# Patient Record
Sex: FEMALE | Race: OTHER | HISPANIC OR LATINO
[De-identification: names, ages, dates, MRNs, and addresses within clinical notes are randomized per-mention and may not be internally consistent; named-entity substitution may affect disease eponyms.]

---

## 2019-07-17 ENCOUNTER — APPOINTMENT (OUTPATIENT)
Dept: PLASTIC SURGERY | Facility: CLINIC | Age: 49
End: 2019-07-17

## 2019-07-17 PROBLEM — Z00.00 ENCOUNTER FOR PREVENTIVE HEALTH EXAMINATION: Status: ACTIVE | Noted: 2019-07-17

## 2021-10-18 ENCOUNTER — INPATIENT (INPATIENT)
Facility: HOSPITAL | Age: 51
LOS: 0 days | Discharge: HOME | DRG: 310 | End: 2021-10-19
Attending: INTERNAL MEDICINE | Admitting: INTERNAL MEDICINE
Payer: COMMERCIAL

## 2021-10-18 VITALS
WEIGHT: 139.99 LBS | SYSTOLIC BLOOD PRESSURE: 128 MMHG | OXYGEN SATURATION: 97 % | RESPIRATION RATE: 16 BRPM | DIASTOLIC BLOOD PRESSURE: 85 MMHG | HEIGHT: 62 IN | HEART RATE: 75 BPM | TEMPERATURE: 98 F

## 2021-10-18 DIAGNOSIS — Z95.818 PRESENCE OF OTHER CARDIAC IMPLANTS AND GRAFTS: Chronic | ICD-10-CM

## 2021-10-18 DIAGNOSIS — R00.1 BRADYCARDIA, UNSPECIFIED: ICD-10-CM

## 2021-10-18 LAB
A1C WITH ESTIMATED AVERAGE GLUCOSE RESULT: 5.6 % — SIGNIFICANT CHANGE UP (ref 4–5.6)
ALBUMIN SERPL ELPH-MCNC: 4.3 G/DL — SIGNIFICANT CHANGE UP (ref 3.3–5)
ALP SERPL-CCNC: 84 U/L — SIGNIFICANT CHANGE UP (ref 40–120)
ALT FLD-CCNC: 15 U/L — SIGNIFICANT CHANGE UP (ref 10–45)
ANION GAP SERPL CALC-SCNC: 12 MMOL/L — SIGNIFICANT CHANGE UP (ref 5–17)
APTT BLD: 28.2 SEC — SIGNIFICANT CHANGE UP (ref 27.5–35.5)
AST SERPL-CCNC: 23 U/L — SIGNIFICANT CHANGE UP (ref 10–40)
BASOPHILS # BLD AUTO: 0.07 K/UL — SIGNIFICANT CHANGE UP (ref 0–0.2)
BASOPHILS NFR BLD AUTO: 1.1 % — SIGNIFICANT CHANGE UP (ref 0–2)
BILIRUB SERPL-MCNC: 0.6 MG/DL — SIGNIFICANT CHANGE UP (ref 0.2–1.2)
BUN SERPL-MCNC: 8 MG/DL — SIGNIFICANT CHANGE UP (ref 7–23)
CALCIUM SERPL-MCNC: 10 MG/DL — SIGNIFICANT CHANGE UP (ref 8.4–10.5)
CHLORIDE SERPL-SCNC: 102 MMOL/L — SIGNIFICANT CHANGE UP (ref 96–108)
CHOLEST SERPL-MCNC: 214 MG/DL — HIGH
CK MB CFR SERPL CALC: 1.4 NG/ML — SIGNIFICANT CHANGE UP (ref 0–6.7)
CK MB CFR SERPL CALC: 1.9 NG/ML — SIGNIFICANT CHANGE UP (ref 0–6.7)
CK SERPL-CCNC: 52 U/L — SIGNIFICANT CHANGE UP (ref 25–170)
CK SERPL-CCNC: 67 U/L — SIGNIFICANT CHANGE UP (ref 25–170)
CO2 SERPL-SCNC: 25 MMOL/L — SIGNIFICANT CHANGE UP (ref 22–31)
CREAT SERPL-MCNC: 0.46 MG/DL — LOW (ref 0.5–1.3)
EOSINOPHIL # BLD AUTO: 0.41 K/UL — SIGNIFICANT CHANGE UP (ref 0–0.5)
EOSINOPHIL NFR BLD AUTO: 6.6 % — HIGH (ref 0–6)
ESTIMATED AVERAGE GLUCOSE: 114 MG/DL — SIGNIFICANT CHANGE UP (ref 68–114)
GLUCOSE SERPL-MCNC: 91 MG/DL — SIGNIFICANT CHANGE UP (ref 70–99)
HCT VFR BLD CALC: 42.3 % — SIGNIFICANT CHANGE UP (ref 34.5–45)
HDLC SERPL-MCNC: 74 MG/DL — SIGNIFICANT CHANGE UP
HGB BLD-MCNC: 14 G/DL — SIGNIFICANT CHANGE UP (ref 11.5–15.5)
IMM GRANULOCYTES NFR BLD AUTO: 0.2 % — SIGNIFICANT CHANGE UP (ref 0–1.5)
INR BLD: 0.93 — SIGNIFICANT CHANGE UP (ref 0.88–1.16)
LIPID PNL WITH DIRECT LDL SERPL: 109 MG/DL — HIGH
LYMPHOCYTES # BLD AUTO: 2.02 K/UL — SIGNIFICANT CHANGE UP (ref 1–3.3)
LYMPHOCYTES # BLD AUTO: 32.4 % — SIGNIFICANT CHANGE UP (ref 13–44)
MAGNESIUM SERPL-MCNC: 2.3 MG/DL — SIGNIFICANT CHANGE UP (ref 1.6–2.6)
MCHC RBC-ENTMCNC: 30.9 PG — SIGNIFICANT CHANGE UP (ref 27–34)
MCHC RBC-ENTMCNC: 33.1 GM/DL — SIGNIFICANT CHANGE UP (ref 32–36)
MCV RBC AUTO: 93.4 FL — SIGNIFICANT CHANGE UP (ref 80–100)
MONOCYTES # BLD AUTO: 0.63 K/UL — SIGNIFICANT CHANGE UP (ref 0–0.9)
MONOCYTES NFR BLD AUTO: 10.1 % — SIGNIFICANT CHANGE UP (ref 2–14)
NEUTROPHILS # BLD AUTO: 3.1 K/UL — SIGNIFICANT CHANGE UP (ref 1.8–7.4)
NEUTROPHILS NFR BLD AUTO: 49.6 % — SIGNIFICANT CHANGE UP (ref 43–77)
NON HDL CHOLESTEROL: 140 MG/DL — HIGH
NRBC # BLD: 0 /100 WBCS — SIGNIFICANT CHANGE UP (ref 0–0)
PHOSPHATE SERPL-MCNC: 4.6 MG/DL — HIGH (ref 2.5–4.5)
PLATELET # BLD AUTO: 244 K/UL — SIGNIFICANT CHANGE UP (ref 150–400)
POTASSIUM SERPL-MCNC: 4.2 MMOL/L — SIGNIFICANT CHANGE UP (ref 3.5–5.3)
POTASSIUM SERPL-SCNC: 4.2 MMOL/L — SIGNIFICANT CHANGE UP (ref 3.5–5.3)
PROT SERPL-MCNC: 7.9 G/DL — SIGNIFICANT CHANGE UP (ref 6–8.3)
PROTHROM AB SERPL-ACNC: 11.2 SEC — SIGNIFICANT CHANGE UP (ref 10.6–13.6)
RBC # BLD: 4.53 M/UL — SIGNIFICANT CHANGE UP (ref 3.8–5.2)
RBC # FLD: 13.9 % — SIGNIFICANT CHANGE UP (ref 10.3–14.5)
SARS-COV-2 RNA SPEC QL NAA+PROBE: NEGATIVE — SIGNIFICANT CHANGE UP
SODIUM SERPL-SCNC: 139 MMOL/L — SIGNIFICANT CHANGE UP (ref 135–145)
TRIGL SERPL-MCNC: 156 MG/DL — HIGH
TROPONIN T SERPL-MCNC: 0.01 NG/ML — SIGNIFICANT CHANGE UP (ref 0–0.01)
TROPONIN T SERPL-MCNC: <0.01 NG/ML — SIGNIFICANT CHANGE UP (ref 0–0.01)
TSH SERPL-MCNC: 2.3 UIU/ML — SIGNIFICANT CHANGE UP (ref 0.27–4.2)
WBC # BLD: 6.24 K/UL — SIGNIFICANT CHANGE UP (ref 3.8–10.5)
WBC # FLD AUTO: 6.24 K/UL — SIGNIFICANT CHANGE UP (ref 3.8–10.5)

## 2021-10-18 PROCEDURE — 93010 ELECTROCARDIOGRAM REPORT: CPT

## 2021-10-18 PROCEDURE — 99285 EMERGENCY DEPT VISIT HI MDM: CPT

## 2021-10-18 PROCEDURE — 71045 X-RAY EXAM CHEST 1 VIEW: CPT | Mod: 26

## 2021-10-18 RX ORDER — OMEGA-3 ACID ETHYL ESTERS 1 G
2 CAPSULE ORAL DAILY
Refills: 0 | Status: DISCONTINUED | OUTPATIENT
Start: 2021-10-18 | End: 2021-10-18

## 2021-10-18 RX ORDER — CHOLECALCIFEROL (VITAMIN D3) 125 MCG
400 CAPSULE ORAL DAILY
Refills: 0 | Status: DISCONTINUED | OUTPATIENT
Start: 2021-10-19 | End: 2021-10-19

## 2021-10-18 RX ORDER — ASPIRIN/CALCIUM CARB/MAGNESIUM 324 MG
81 TABLET ORAL DAILY
Refills: 0 | Status: DISCONTINUED | OUTPATIENT
Start: 2021-10-18 | End: 2021-10-19

## 2021-10-18 RX ADMIN — Medication 81 MILLIGRAM(S): at 15:56

## 2021-10-18 NOTE — H&P ADULT - GASTROINTESTINAL
Pt Name: Bridgett Rivera  YOB: 1965     To Whom It May Concern:     Bridgett Rivera was in contact with/seen in my office on 03/21/2020. COVID-19 is present in our communities across the state. There is limited testing for COVID at this time, so not all patients can be tested. In this situation, your employee meets the following criteria:     Bridgett Rivera has expressed a desire/need to be tested for COVID-19 virus and does have some symptoms (upper respiratory, fever, etc) but does not meet all the criteria for testing as defined by the Center of Disease Control/Office of Public Health at this time. The employee can return to work once they are asymptomatic for 72 hours without the use of fever reducing medications (Tylenol, Motrin, etc). Infection control policies of the employer should be followed, as well as good hand hygiene.     If you have any questions or concerns, or if I can be of further assistance, please do not hesitate to contact me.     Sincerely,    Provider Signature/Printed Name:Tuyet [place] NP Date:03/21/2020     
Soft, non-tender, no hepatosplenomegaly, normal bowel sounds

## 2021-10-18 NOTE — ED PROVIDER NOTE - PROGRESS NOTE DETAILS
Isaifish: d/w Dr. Peña - pt was having 2:1 AV block w/ HR 30s so called to come to ED. Requesting labs and admission for further care. States he spoke w/ Dr. Lopez from EP who will consult on pt.  updated pt.

## 2021-10-18 NOTE — ED ADULT NURSE NOTE - CHIEF COMPLAINT QUOTE
Pt presents to ED C/O chest tightness starting this morning. Pt on halter monitor since Fri. Sent by outpatient cardiologist Pt states " My doctor told me to come straight to the ER, he was seeing a lot of activity on my monitor". Denies N/V/SOB. Hx of tachycardia.

## 2021-10-18 NOTE — H&P ADULT - NSHPSOCIALHISTORY_GEN_ALL_CORE
former smoker; quit 10 years, smoked 1/2 PPD since 16 years old  denies any illicit drug use  drinks ETOH socially

## 2021-10-18 NOTE — H&P ADULT - ASSESSMENT
52 y/o F with PMH of ?tachycardia with skipped beats as per patient (had loop recorder X 2years, removed 05/2021) who is now admitted to cardiology service to r/o ACS and monitor on tele for bradycardia with EP Dr. Lopez consult.    50 y/o F  Former smoker with FHx of CVA,  PMH of ?tachycardia with skipped beats as per patient (had loop recorder X 2years, removed 05/2021) who is now admitted to cardiology service to r/o ACS and monitor on tele for bradycardia with EP Dr. Lopez consult.    52 y/o F  Former smoker with FHx of CVA,  PMH of ?tachycardia with skipped beats as per patient (had loop recorder X 2years, removed 05/2021), HLD who is now admitted to cardiology service to r/o ACS and monitor on tele for bradycardia with EP Dr. Lopez consult.

## 2021-10-18 NOTE — ED ADULT TRIAGE NOTE - CHIEF COMPLAINT QUOTE
Pt presents to ED C/O chest tightness starting this morning. Pt on halter monitor since Fri. Sent by outpatient cardiologist Pt states " My doctor told me to come straight to the ER, he was seeing a lot of activity on my monitor". Hx of tachycardia. Pt presents to ED C/O chest tightness starting this morning. Pt on halter monitor since Fri. Sent by outpatient cardiologist Pt states " My doctor told me to come straight to the ER, he was seeing a lot of activity on my monitor". Denies N/V/SOB. Hx of tachycardia.

## 2021-10-18 NOTE — H&P ADULT - PROBLEM SELECTOR PLAN 1
Resolved with volume removal, O2 sats stable on supp oxygen 2-3 lpm (baseline)   -Currently asx  -Trop T neg X 1; f/u trop T 8 pm and am (trend earlier if 8 pm trop T elevated)  -EKG: SR with 1st degree AVB @ 67 bpm; flat T wave in lead 3, V3-v4,  -holter monitor revealing 2:1 AV block w/ HR 30s  -monitor on tele  -EP Dr. chris consulted; f/u recs.

## 2021-10-18 NOTE — H&P ADULT - PROBLEM SELECTOR PLAN 2
-Trop T neg X 1; f/u trop T 8 pm and am (trend earlier if 8 pm trop T elevated)  -EKG: SR with 1st degree AVB @ 67 bpm; flat T wave in lead 3, V3-v4,  -ECHO   -NST  -f/u lipid profile, TSH and HgBA1c      DVT PPX: SCD  Dispo: monitor on tele -Trop T neg X 1; f/u trop T 8 pm and am (trend earlier if 8 pm trop T elevated)  -EKG: SR with 1st degree AVB @ 67 bpm; flat T wave in lead 3, V3-v4,  -ECHO 10/15/21 (results pending; Dr. murphy to email)  -NPO after MN for NST in am  -f/u lipid profile, TSH and HgBA1c      DVT PPX: SCD  Dispo: monitor on tele, NST in am, EP recs -Trop T neg X 1; f/u trop T 8 pm and am (trend earlier if 8 pm trop T elevated)  -EKG: SR with 1st degree AVB @ 67 bpm; flat T wave in lead 3, V3-v4,  -ECHO 10/15/21 revealing inferoseptal ischemia; please confirm EF with Dr. Peña  -NPO after MN for NST in am  -f/u lipid profile, TSH and HgBA1c      DVT PPX: SCD  Dispo: monitor on tele, NST in am, EP recs

## 2021-10-18 NOTE — ED ADULT NURSE NOTE - OBJECTIVE STATEMENT
Pt presents to ED sent by cardiologist because "abnormal electrical activity" to holter. Pt have a cardiac holter for 4 days, c/o midsternal chest discomfort, no pain. H/o tachycardia. Denies SOB, HA, N/V/D. Pt presents to ED sent by cardiologist because of "abnormal electrical activity" in the cardiac holter. Pt have a cardiac holter for 4 days, c/o midsternal chest discomfort, no pain. H/o tachycardia. Denies SOB, HA, N/V/D. EKG done, placed on CM showing SR @ 65 bpm.

## 2021-10-18 NOTE — H&P ADULT - NSHPLABSRESULTS_GEN_ALL_CORE
14.0   6.24  )-----------( 244      ( 18 Oct 2021 14:44 )             42.3   PT/INR - ( 18 Oct 2021 14:44 )   PT: 11.2 sec;   INR: 0.93          PTT - ( 18 Oct 2021 14:44 )  PTT:28.2 sec    10-18    139  |  102  |  x   ----------------------------<  x   4.2   |  x   |  x

## 2021-10-18 NOTE — ED PROVIDER NOTE - MDM ORDERS SUBMITTED SELECTION
Urinalysis, thyroid, blood count, cholesterol, prostate are normal.  Liver, kidneys, sugar normal. Labs/EKG

## 2021-10-18 NOTE — ED PROVIDER NOTE - OBJECTIVE STATEMENT
51F PMH "tachycardia" (not on meds) p/w ?abnormal Holter/echo. Pt states she has intermittent palpitations for "a long time." Went to Dr. Dionne Peña 3d ago and had echo performed and holter placed. Pt states she was told to come to ED today 2/2 abnormal results. Last episode of palpitations was last night. No other systemic symptoms.  Denies associated CP, SOB, nausea, vomiting, diarrhea, lightheaded, diaphoresis, cough, rhinorrhea, black stool, bloody stool, LE pain, LE swelling, focal weakness/numbness, recent travel/immobilization, abd pain, urinary complaints, f/c. No hormone use. 51F PMH "tachycardia" (not on meds) p/w ?abnormal Holter/echo. Pt states she has intermittent palpitations for "a long time." Went to Dr. Dionne Peña 3d ago and had echo performed and holter placed. Pt states she was told to come to ED today 2/2 abnormal results. Last episode of palpitations was last night. Also has intermittent pain to L lateral chest/arm pit region. No other systemic symptoms.  Denies other CP, SOB, nausea, vomiting, diarrhea, lightheaded, diaphoresis, cough, rhinorrhea, black stool, bloody stool, LE pain, LE swelling, focal weakness/numbness, recent travel/immobilization, abd pain, urinary complaints, f/c. No hormone use.

## 2021-10-18 NOTE — H&P ADULT - NSICDXFAMILYHX_GEN_ALL_CORE_FT
FAMILY HISTORY:  Father  Still living? Unknown  FH: pancreatic cancer, Age at diagnosis: Age Unknown    Mother  Still living? Unknown  Family history of CVA, Age at diagnosis: Age Unknown

## 2021-10-18 NOTE — H&P ADULT - HISTORY OF PRESENT ILLNESS
52 y/o F with PMH of ?tachycardia with skipped beats as per patient (had loop recorder X 2 years, removed 05/2021) who was referred to [facility] ED by her cardiologist Dr. Peña for noted 2:1 AV block w/ HR 30s in holter monitor. Pt. presented to see Dr. Peña on Friday for 2nd opinion for her palpitations. She reports having intermittent palpitations for a while occurring independent of activity. 2 weeks ago; she also started experiencing L side arm pain, then after that she started experiencing a weird sensation in her heart like "tugging of string" radiating to her L arm and L chest which occurs intermittently independent of activity. In the office on Friday,  Pt. had ECHO which revealed. … .. started on ASA 81 mg daily and sent home with a holter monitor (report as above). Currently, Pt. denies any chest pain, SOB, dizziness, N/V, LE edema, PND/orthopnea, syncope. In ED, /85 HR 75 RR 16 T 97.8 02 97 RA. Labs revealed COVID negative, Trop T neg X 1,  EKG: NSR @ 67 bpm; flat T wave in lead 3, V3-v4. CXR: prelim revealed clear.  Pt. is now admitted to cardiology service to r/o ACS and monitor on tele for bradycardia with EP Dr. Lopez consult.      52 y/o F Former smoker; Fhx of CVA, with PMH of ?tachycardia with skipped beats as per patient (had loop recorder X 2 years, removed 05/2021) who was referred to [facility] ED by her cardiologist Dr. Peña for noted 2:1 AV block w/ HR 30s in holter monitor. Pt. presented to see Dr. Peña on Friday for 2nd opinion for her palpitations. She reports having intermittent palpitations for a while occurring independent of activity. 2 weeks ago; she also started experiencing L side arm pain, then after that she started experiencing a weird sensation in her heart like "tugging of string" radiating to her L arm and L chest which occurs intermittently independent of activity. In the office on Friday,  Pt. had ECHO in office (results pending), started on ASA 81 mg daily and sent home with a holter monitor (report as above). Currently, Pt. denies any chest pain, SOB, dizziness, N/V, LE edema, PND/orthopnea, syncope. In ED, /85 HR 75 RR 16 T 97.8 02 97 RA. Labs revealed COVID negative, Trop T neg X 1,  EKG: NSR @ 67 bpm; flat T wave in lead 3, V3-v4. CXR: prelim revealed clear.  Pt. is now admitted to cardiology service to r/o ACS and monitor on tele for bradycardia with EP Dr. Lopez consult.      50 y/o F Former smoker; Fhx of CVA, with PMH of ?tachycardia with skipped beats as per patient (had loop recorder X 2 years, removed 05/2021), HLD, who was referred to [facility] ED by her cardiologist Dr. Peña for noted 2:1 AV block w/ HR 30s in holter monitor. Pt. presented to see Dr. Peña on Friday for 2nd opinion for her palpitations X 6 months. She reports having intermittent palpitations for a while occurring independent of activity. 2 weeks ago; she also started experiencing L side arm pain, then after that she started experiencing a weird sensation in her heart like "tugging of string" radiating to her L arm and L chest which occurs intermittently independent of activity. Also she endorses having new MERRITT X 2 months, has MERRITT on ambulating less than 1 block which is relieved with rest and worse with exrtion.  In the office on Friday,  Pt. had ECHO in office revealing inferoseptal hypokinesis, started on ASA 81 mg daily and sent home with a holter monitor (report as above). Currently, Pt. denies any chest pain, SOB, dizziness, N/V, LE edema, PND/orthopnea, syncope. In ED, /85 HR 75 RR 16 T 97.8 02 97 RA. Labs revealed COVID negative, Trop T neg X 1,  EKG: NSR @ 67 bpm; flat T wave in lead 3, V3-v4. CXR: prelim revealed clear.  Pt. is now admitted to cardiology service to r/o ACS and monitor on tele for bradycardia with EP Dr. Lopez consult.

## 2021-10-18 NOTE — ED PROVIDER NOTE - CLINICAL SUMMARY MEDICAL DECISION MAKING FREE TEXT BOX
51F PMH "tachycardia" (not on meds) p/w ?abnormal Holter/echo. Pt states she has intermittent palpitations for "a long time." Went to Dr. Dionne Peña 3d ago and had echo performed and holter placed. Pt states she was told to come to ED today 2/2 abnormal results. Last episode of palpitations was last night. No other systemic symptoms. Pt currently asymptomatic. Vitals wnl, exam as above.  ddx: Unclear pathology.   LAbs, XR, COVID.   I called Dr. Peña office at [phone] and left message. 51F PMH "tachycardia" (not on meds) p/w ?abnormal Holter/echo. Pt states she has intermittent palpitations for "a long time." Went to Dr. Dionne Peña 3d ago and had echo performed and holter placed. Pt states she was told to come to ED today 2/2 abnormal results. Last episode of palpitations was last night. Also has intermittent pain to L lateral chest/arm pit region. No other systemic symptoms. Pt currently asymptomatic. Vitals wnl, exam as above.  ddx: Unclear pathology.   LAbs, XR, COVID.   I called Dr. Peña office at [phone] and left message.

## 2021-10-19 ENCOUNTER — TRANSCRIPTION ENCOUNTER (OUTPATIENT)
Age: 51
End: 2021-10-19

## 2021-10-19 VITALS — TEMPERATURE: 98 F

## 2021-10-19 LAB
ANION GAP SERPL CALC-SCNC: 15 MMOL/L — SIGNIFICANT CHANGE UP (ref 5–17)
BUN SERPL-MCNC: 10 MG/DL — SIGNIFICANT CHANGE UP (ref 7–23)
CALCIUM SERPL-MCNC: 9.9 MG/DL — SIGNIFICANT CHANGE UP (ref 8.4–10.5)
CHLORIDE SERPL-SCNC: 107 MMOL/L — SIGNIFICANT CHANGE UP (ref 96–108)
CK MB CFR SERPL CALC: 1.3 NG/ML — SIGNIFICANT CHANGE UP (ref 0–6.7)
CK SERPL-CCNC: 49 U/L — SIGNIFICANT CHANGE UP (ref 25–170)
CO2 SERPL-SCNC: 21 MMOL/L — LOW (ref 22–31)
COVID-19 SPIKE DOMAIN AB INTERP: POSITIVE
COVID-19 SPIKE DOMAIN ANTIBODY RESULT: >250 U/ML — HIGH
CREAT SERPL-MCNC: 0.56 MG/DL — SIGNIFICANT CHANGE UP (ref 0.5–1.3)
GLUCOSE SERPL-MCNC: 108 MG/DL — HIGH (ref 70–99)
POTASSIUM SERPL-MCNC: 4.6 MMOL/L — SIGNIFICANT CHANGE UP (ref 3.5–5.3)
POTASSIUM SERPL-SCNC: 4.6 MMOL/L — SIGNIFICANT CHANGE UP (ref 3.5–5.3)
SARS-COV-2 IGG+IGM SERPL QL IA: >250 U/ML — HIGH
SARS-COV-2 IGG+IGM SERPL QL IA: POSITIVE
SODIUM SERPL-SCNC: 143 MMOL/L — SIGNIFICANT CHANGE UP (ref 135–145)
TROPONIN T SERPL-MCNC: 0.01 NG/ML — SIGNIFICANT CHANGE UP (ref 0–0.01)

## 2021-10-19 PROCEDURE — 83036 HEMOGLOBIN GLYCOSYLATED A1C: CPT

## 2021-10-19 PROCEDURE — 80053 COMPREHEN METABOLIC PANEL: CPT

## 2021-10-19 PROCEDURE — 84443 ASSAY THYROID STIM HORMONE: CPT

## 2021-10-19 PROCEDURE — 82553 CREATINE MB FRACTION: CPT

## 2021-10-19 PROCEDURE — 71045 X-RAY EXAM CHEST 1 VIEW: CPT

## 2021-10-19 PROCEDURE — 93016 CV STRESS TEST SUPVJ ONLY: CPT

## 2021-10-19 PROCEDURE — 78452 HT MUSCLE IMAGE SPECT MULT: CPT | Mod: 26

## 2021-10-19 PROCEDURE — 84484 ASSAY OF TROPONIN QUANT: CPT

## 2021-10-19 PROCEDURE — 93018 CV STRESS TEST I&R ONLY: CPT

## 2021-10-19 PROCEDURE — 36415 COLL VENOUS BLD VENIPUNCTURE: CPT

## 2021-10-19 PROCEDURE — 84100 ASSAY OF PHOSPHORUS: CPT

## 2021-10-19 PROCEDURE — 80048 BASIC METABOLIC PNL TOTAL CA: CPT

## 2021-10-19 PROCEDURE — 83735 ASSAY OF MAGNESIUM: CPT

## 2021-10-19 PROCEDURE — A9505: CPT

## 2021-10-19 PROCEDURE — 78452 HT MUSCLE IMAGE SPECT MULT: CPT

## 2021-10-19 PROCEDURE — 99285 EMERGENCY DEPT VISIT HI MDM: CPT

## 2021-10-19 PROCEDURE — 82550 ASSAY OF CK (CPK): CPT

## 2021-10-19 PROCEDURE — 80061 LIPID PANEL: CPT

## 2021-10-19 PROCEDURE — A9500: CPT

## 2021-10-19 PROCEDURE — 85610 PROTHROMBIN TIME: CPT

## 2021-10-19 PROCEDURE — 85730 THROMBOPLASTIN TIME PARTIAL: CPT

## 2021-10-19 PROCEDURE — 93017 CV STRESS TEST TRACING ONLY: CPT

## 2021-10-19 PROCEDURE — 86769 SARS-COV-2 COVID-19 ANTIBODY: CPT

## 2021-10-19 PROCEDURE — 85025 COMPLETE CBC W/AUTO DIFF WBC: CPT

## 2021-10-19 PROCEDURE — 93005 ELECTROCARDIOGRAM TRACING: CPT

## 2021-10-19 PROCEDURE — 87635 SARS-COV-2 COVID-19 AMP PRB: CPT

## 2021-10-19 RX ADMIN — Medication 1 TABLET(S): at 09:22

## 2021-10-19 RX ADMIN — Medication 400 UNIT(S): at 09:22

## 2021-10-19 RX ADMIN — Medication 81 MILLIGRAM(S): at 09:22

## 2021-10-19 NOTE — DISCHARGE NOTE PROVIDER - NSDCCPCAREPLAN_GEN_ALL_CORE_FT
PRINCIPAL DISCHARGE DIAGNOSIS  Diagnosis: Wenckebach's phenomenon, heart block  Assessment and Plan of Treatment: You were found to have an arrhythmia called second degree heart block type 1, or the other name for it is "Wenkebach". This is a type of heart block that can cause your heart to beat more slowly than normal or to skip beats. You had a excercise nuclear stress test that was normal. You were seen by the heart rhythm specialist (electrophysiologist) Dr Lopez. At this time you do not need a pacemaker implantation. It is recommended for you to undergo further testing for the cause for the arrhythmia including blood work and a sleep study to evaluate for possible sleep apnea. Follow up with Dr Peña and Dr Lopez in the office.

## 2021-10-19 NOTE — CONSULT NOTE ADULT - SUBJECTIVE AND OBJECTIVE BOX
SIOBHAN CHENG  MRN-[id number]  10-19-21    card/ep    asked by dr murphy to eval for bradycardia    pt is a 53 y/o h/o palpitations - had ilr - dr golden - removed  noted w palps -   admitted now w notable heart block (at night ) on holter and abnl echo   to have stress test  pt w fhx of stroke ( mom) and [place] and htn (dad)  pt is a nonsmoker, social etoh.   works as   no formal exercise but denies cp  she is disturbed by palps - mainly at night  tele o/n - wenchebach/2:1    ICU Vital Signs Last 24 Hrs  T(C): 36.5 (19 Oct 2021 09:30), Max: 36.8 (18 Oct 2021 13:56)  T(F): 97.7 (19 Oct 2021 09:30), Max: 98.2 (18 Oct 2021 13:56)  HR: 60 (19 Oct 2021 08:19) (60 - 77)  BP: 128/62 (19 Oct 2021 08:19) (101/60 - 128/85)  BP(mean): 85 (19 Oct 2021 08:19) (75 - 89)  ABP: --  ABP(mean): --  RR: 16 (19 Oct 2021 08:19) (16 - 18)  SpO2: 98% (19 Oct 2021 08:19) (96% - 98%)    pe  nad  cta  rr s1s2  s/nt  no edema                          14.0   6.24  )-----------( 244      ( 18 Oct 2021 14:44 )             42.3     10-18    139  |  102  |  8   ----------------------------<  91  4.2   |  25  |  0.46<L>    Ca    10.0      18 Oct 2021 14:44  Phos  4.6     10-18  Mg     2.3     10-18    TPro  7.9  /  Alb  4.3  /  TBili  0.6  /  DBili  x   /  AST  23  /  ALT  15  /  AlkPhos  84  10-18    MEDICATIONS  (STANDING):  aspirin enteric coated 81 milliGRAM(s) Oral daily  cholecalciferol 400 Unit(s) Oral daily  multivitamin 1 Tablet(s) Oral daily    MEDICATIONS  (PRN):

## 2021-10-19 NOTE — CONSULT NOTE ADULT - ASSESSMENT
imp - palps, ilr in past  wenche on tele  no syncope  for stress test today  monitor tele  consider sleep study   can check for lyme/thryoid and infiltrative disease (ie - sarcoid) which can cause elina/heart block    jeanne chris md  c [phone]

## 2021-10-19 NOTE — DISCHARGE NOTE PROVIDER - PROVIDER TOKENS
PROVIDER:[TOKEN:[[id number]],FOLLOWUP:[1 week],ESTABLISHEDPATIENT:[T]],PROVIDER:[TOKEN:[[id number]],FOLLOWUP:[2 weeks]] PROVIDER:[TOKEN:[[id number]],FOLLOWUP:[2 weeks],ESTABLISHEDPATIENT:[T]],PROVIDER:[TOKEN:[[id number]],FOLLOWUP:[1 month]],PROVIDER:[TOKEN:[[id number]],FOLLOWUP:[Routine]]

## 2021-10-19 NOTE — DISCHARGE NOTE PROVIDER - CARE PROVIDER_API CALL
Dionne Peña)  Cardiovascular Disease; Interventional Cardiology  [address], [address]  [place] [address]  Phone: [phone]  Fax: [phone]  Established Patient  Follow Up Time: 1 week    Sanchez Lopez)  Cardiac Electrophysiology; Cardiovascular Disease; Internal Medicine  [address] [place] [address]  Phone: [phone]  Fax: [phone]  Follow Up Time: 2 weeks   Dionne Peña)  Cardiovascular Disease; Interventional Cardiology  [address], [address]  [place] [address]  Phone: [phone]  Fax: [phone]  Established Patient  Follow Up Time: 2 weeks    Sanchez Lopez)  Cardiac Electrophysiology; Cardiovascular Disease; Internal Medicine  [address] [place] [address]  Phone: [phone]  Fax: [phone]  Follow Up Time: 1 month    Yeni Gutierrez)  Critical Care Medicine; Internal Medicine; Pulmonary Disease  [address] [place] [address]  Phone: [phone]  Fax: [phone]  Follow Up Time: Routine

## 2021-10-19 NOTE — DISCHARGE NOTE PROVIDER - HOSPITAL COURSE
50 y/o F Former smoker; Fhx of CVA, with PMH of ?tachycardia with skipped beats as per patient (had loop recorder X 2 years, removed 05/2021), HLD, who was referred to [facility] ED by her cardiologist Dr. Peña for noted 2:1 AV block w/ HR 30s in holter monitor. Pt. presented to see Dr. Peña on Friday for 2nd opinion for her palpitations X 6 months. She reports having intermittent palpitations for a while occurring independent of activity. 2 weeks ago; she also started experiencing L side arm pain, then after that she started experiencing a weird sensation in her heart like "tugging of string" radiating to her L arm and L chest which occurs intermittently independent of activity. Also she endorses having new MERRITT X 2 months, has MERRITT on ambulating less than 1 block which is relieved with rest and worse with exrtion.  In the office on Friday,  Pt. had ECHO in office revealing inferoseptal hypokinesis, started on ASA 81 mg daily and sent home with a holter monitor (report as above). Currently, Pt. denies any chest pain, SOB, dizziness, N/V, LE edema, PND/orthopnea, syncope. In ED, /85 HR 75 RR 16 T 97.8 02 97 RA. Labs revealed COVID negative, Trop T neg X 1,  EKG: NSR @ 67 bpm; flat T wave in lead 3, V3-v4. CXR: prelim revealed clear.  Pt. is now admitted to cardiology service to r/o ACS and monitor on tele for bradycardia with EP Dr. Lopez consult. 52 y/o F Former smoker; Fhx of CVA, with PMH of ?tachycardia with skipped beats as per patient (had loop recorder X 2 years, removed 05/2021), HLD, who was referred to [facility] ED by her cardiologist Dr. Peña for noted 2:1 AV block w/ HR 30s in holter monitor. Pt. presented to see Dr. Peña on Friday for 2nd opinion for her palpitations X 6 months. She reports having intermittent palpitations for a while occurring independent of activity. 2 weeks ago; she also started experiencing L side arm pain, then after that she started experiencing a weird sensation in her heart like "tugging of string" radiating to her L arm and L chest which occurs intermittently independent of activity. Also she endorses having new MERRITT X 2 months, has MERRITT on ambulating less than 1 block which is relieved with rest and worse with exrtion.  In the office on Friday,  Pt. had ECHO revealing EF 68%, basal anteroseptal, basal inferoseptal, mid anteroseptal and mid inferoseptal hypokineiss; mild MR. Pt was started on ASA 81 mg daily and sent home with a Holter monitor (report as above). Currently, Pt. denies any chest pain, SOB, dizziness, N/V, LE edema, PND/orthopnea, syncope. In ED, /85 HR 75 RR 16 T 97.8 02 97 RA. Labs revealed COVID negative, Trop T neg x 3,  EKG: NSR @ 67 bpm w/ 1st degree HB, nonspecific ST changes. CXR unremarkable.  Admitted to cardiology service ruled out for ACS. Exercise NST revealed myocardial perfusion imaging is normal. Overall left ventricular systolic function is hyperdynamic without regional wall motion abnormalities. The EKG stress test is normal. Pt was monitored on tele, noting episodes of Wenckebach w/ HR 30s overnight and 7am. Pt reports being asymptomatic during the episode and was unaware at the time. Denies Hx of syncope. EP Dr. Lopez consulted, no plan for PPM implant at this time. Rec outpatient follow up w/ blood work including lyme, sarcoid, thyroid and sleep study.    On the day of discharge, the patient was seen and examined. Symptoms improved. Vital signs are stable. Labs and imaging reviewed. Patient is medically optimized and hemodynamically stable. Return precautions discussed, medication teach back done, and importance of physician followup emphasized. The patient verbalized understanding.

## 2021-10-19 NOTE — DISCHARGE NOTE PROVIDER - NSDCMRMEDTOKEN_GEN_ALL_CORE_FT
aspirin 81 mg oral tablet: orally once a day  magnesium carbonate 250 mg oral capsule: orally once a day  Multiple Vitamins oral capsule: 1 cap(s) orally once a day  Omega-3 oral capsule: orally once a day  Probiotic Formula oral capsule: 1 cap(s) orally once a day  Vitamin D3: orally once a day   aspirin 81 mg oral tablet: orally once a day  magnesium carbonate 250 mg oral capsule: orally once a day  multivitamin: 1 tab(s) orally once a day  Omega-3 oral capsule: orally once a day  Probiotic Formula oral capsule: 1 cap(s) orally once a day  Vitamin D3: orally once a day

## 2021-10-19 NOTE — DISCHARGE NOTE NURSING/CASE MANAGEMENT/SOCIAL WORK - PATIENT PORTAL LINK FT
You can access the FollowMyHealth Patient Portal offered by [facility] by registering at the following website: [email or web address]. By joining [facility]’s FollowMyHealth portal, you will also be able to view your health information using other applications (apps) compatible with our system.

## 2021-10-19 NOTE — DISCHARGE NOTE PROVIDER - CARE PROVIDERS DIRECT ADDRESSES
,DirectAddress_Unknown,[email or web address]

## 2021-10-26 DIAGNOSIS — Z79.82 LONG TERM (CURRENT) USE OF ASPIRIN: ICD-10-CM

## 2021-10-26 DIAGNOSIS — E78.5 HYPERLIPIDEMIA, UNSPECIFIED: ICD-10-CM

## 2021-10-26 DIAGNOSIS — I44.1 ATRIOVENTRICULAR BLOCK, SECOND DEGREE: ICD-10-CM

## 2021-10-26 DIAGNOSIS — Z87.891 PERSONAL HISTORY OF NICOTINE DEPENDENCE: ICD-10-CM

## 2021-10-26 DIAGNOSIS — Z79.899 OTHER LONG TERM (CURRENT) DRUG THERAPY: ICD-10-CM

## 2021-10-26 DIAGNOSIS — I34.0 NONRHEUMATIC MITRAL (VALVE) INSUFFICIENCY: ICD-10-CM

## 2021-11-10 ENCOUNTER — APPOINTMENT (OUTPATIENT)
Dept: PULMONOLOGY | Facility: CLINIC | Age: 51
End: 2021-11-10

## 2022-04-01 PROBLEM — Z00.00 ENCOUNTER FOR PREVENTIVE HEALTH EXAMINATION: Noted: 2022-04-01

## 2022-05-11 ENCOUNTER — NON-APPOINTMENT (OUTPATIENT)
Age: 52
End: 2022-05-11

## 2022-05-12 RX ORDER — OMEGA-3 ACID ETHYL ESTERS 1 G
0 CAPSULE ORAL
Qty: 0 | Refills: 0 | DISCHARGE

## 2022-05-12 RX ORDER — CHOLECALCIFEROL (VITAMIN D3) 125 MCG
0 CAPSULE ORAL
Qty: 0 | Refills: 0 | DISCHARGE

## 2022-05-12 RX ORDER — L.ACIDOPH/B.ANIMALIS/B.LONGUM 15B CELL
1 CAPSULE ORAL
Qty: 0 | Refills: 0 | DISCHARGE

## 2022-05-12 RX ORDER — MAGNESIUM CARBONATE 54 MG/5 ML
0 LIQUID (ML) ORAL
Qty: 0 | Refills: 0 | DISCHARGE

## 2022-05-12 RX ORDER — ASPIRIN/CALCIUM CARB/MAGNESIUM 324 MG
0 TABLET ORAL
Qty: 0 | Refills: 0 | DISCHARGE

## 2022-05-17 ENCOUNTER — APPOINTMENT (OUTPATIENT)
Dept: HEART AND VASCULAR | Facility: CLINIC | Age: 52
End: 2022-05-17
Payer: COMMERCIAL

## 2022-05-17 ENCOUNTER — NON-APPOINTMENT (OUTPATIENT)
Age: 52
End: 2022-05-17

## 2022-05-17 VITALS
OXYGEN SATURATION: 98 % | WEIGHT: 150 LBS | HEART RATE: 65 BPM | HEIGHT: 62 IN | BODY MASS INDEX: 27.6 KG/M2 | TEMPERATURE: 96.9 F | DIASTOLIC BLOOD PRESSURE: 80 MMHG | SYSTOLIC BLOOD PRESSURE: 113 MMHG

## 2022-05-17 DIAGNOSIS — Z82.3 FAMILY HISTORY OF STROKE: ICD-10-CM

## 2022-05-17 DIAGNOSIS — Z87.891 PERSONAL HISTORY OF NICOTINE DEPENDENCE: ICD-10-CM

## 2022-05-17 DIAGNOSIS — I44.1 ATRIOVENTRICULAR BLOCK, SECOND DEGREE: ICD-10-CM

## 2022-05-17 PROCEDURE — 93000 ELECTROCARDIOGRAM COMPLETE: CPT

## 2022-05-17 PROCEDURE — 99204 OFFICE O/P NEW MOD 45 MIN: CPT

## 2022-05-17 NOTE — CARDIOLOGY SUMMARY
[de-identified] : 5/17/22 Sinus  Rhythm  -First degree A-V block [de-identified] : TTE 3/2022 Normal LV size and function, mild VT

## 2022-05-17 NOTE — HISTORY OF PRESENT ILLNESS
[FreeTextEntry1] : 51 y/o F with h/o HLD (diet controlled) and Wenckebach who presents for initial evaluation.  \mey javier Reports she was diagnosed with Wenckebach in her 30's.  She describes having a possible EP study @ [facility] to evaluate the need for a pacemaker at that time; ultimately no PPM placed.  Had ILR placed several years ago, removed in 5/2021.  Holter monitor 10/2021 significant for SR with one episode of Wenckebach HR 33 bpm at 7AM - strips not available.  She was admitted to [facility] for work-up; evaluated by Dr. Lopez who did not recommend PPM at that time.  Exercise NST revealed myocardial perfusion imaging normal.  She denies any episodes of rapid/irregular heart action.  No CP, dizziness, presyncope/syncope. 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  \mey javier Scheduled for NST in June 2022.

## 2022-06-14 ENCOUNTER — APPOINTMENT (OUTPATIENT)
Dept: HEART AND VASCULAR | Facility: CLINIC | Age: 52
End: 2022-06-14

## 2022-09-28 PROBLEM — E78.5 HYPERLIPIDEMIA, UNSPECIFIED: Chronic | Status: ACTIVE | Noted: 2022-09-26

## 2022-09-28 PROBLEM — I87.2 VENOUS INSUFFICIENCY (CHRONIC) (PERIPHERAL): Chronic | Status: ACTIVE | Noted: 2022-09-26

## 2022-10-04 ENCOUNTER — APPOINTMENT (OUTPATIENT)
Dept: CT IMAGING | Facility: CLINIC | Age: 52
End: 2022-10-04

## 2022-10-04 ENCOUNTER — OUTPATIENT (OUTPATIENT)
Dept: OUTPATIENT SERVICES | Facility: HOSPITAL | Age: 52
LOS: 1 days | End: 2022-10-04

## 2022-10-04 DIAGNOSIS — Z95.818 PRESENCE OF OTHER CARDIAC IMPLANTS AND GRAFTS: Chronic | ICD-10-CM

## 2022-10-04 PROCEDURE — 75574 CT ANGIO HRT W/3D IMAGE: CPT | Mod: 26

## 2023-04-27 NOTE — ED PROVIDER NOTE - CARE PLAN
Addended by: JENNIFER FERNANDES on: 4/27/2023 12:31 PM     Modules accepted: Orders     1 Principal Discharge DX:	Second-degree heart block